# Patient Record
Sex: FEMALE | Race: WHITE | Employment: OTHER | URBAN - METROPOLITAN AREA
[De-identification: names, ages, dates, MRNs, and addresses within clinical notes are randomized per-mention and may not be internally consistent; named-entity substitution may affect disease eponyms.]

---

## 2021-10-14 ENCOUNTER — TELEPHONE (OUTPATIENT)
Dept: ONCOLOGY | Age: 66
End: 2021-10-14

## 2021-10-14 NOTE — TELEPHONE ENCOUNTER
Called pt to schedule consult. Pt declined appt. She stated Dr. Melinda Ott found another provider as she requested.

## 2022-08-31 NOTE — PERIOP NOTES
Called and spoke with  to confirm patient's appointment time and arrival time for tomorrow's procedure

## 2022-09-01 ENCOUNTER — ANESTHESIA EVENT (OUTPATIENT)
Dept: ENDOSCOPY | Age: 67
End: 2022-09-01
Payer: MEDICARE

## 2022-09-01 ENCOUNTER — HOSPITAL ENCOUNTER (OUTPATIENT)
Age: 67
Setting detail: OUTPATIENT SURGERY
Discharge: HOME | End: 2022-09-01
Attending: INTERNAL MEDICINE | Admitting: INTERNAL MEDICINE
Payer: MEDICARE

## 2022-09-01 ENCOUNTER — ANESTHESIA (OUTPATIENT)
Dept: ENDOSCOPY | Age: 67
End: 2022-09-01
Payer: MEDICARE

## 2022-09-01 VITALS
TEMPERATURE: 98.4 F | SYSTOLIC BLOOD PRESSURE: 125 MMHG | HEART RATE: 70 BPM | HEIGHT: 64 IN | OXYGEN SATURATION: 97 % | WEIGHT: 128.7 LBS | BODY MASS INDEX: 21.97 KG/M2 | DIASTOLIC BLOOD PRESSURE: 64 MMHG | RESPIRATION RATE: 16 BRPM

## 2022-09-01 PROCEDURE — 74011250636 HC RX REV CODE- 250/636: Performed by: INTERNAL MEDICINE

## 2022-09-01 PROCEDURE — 2709999900 HC NON-CHARGEABLE SUPPLY: Performed by: INTERNAL MEDICINE

## 2022-09-01 PROCEDURE — 74011000250 HC RX REV CODE- 250: Performed by: NURSE ANESTHETIST, CERTIFIED REGISTERED

## 2022-09-01 PROCEDURE — 74011250636 HC RX REV CODE- 250/636: Performed by: NURSE ANESTHETIST, CERTIFIED REGISTERED

## 2022-09-01 PROCEDURE — 77030021593 HC FCPS BIOP ENDOSC BSC -A: Performed by: INTERNAL MEDICINE

## 2022-09-01 PROCEDURE — 76060000031 HC ANESTHESIA FIRST 0.5 HR: Performed by: INTERNAL MEDICINE

## 2022-09-01 PROCEDURE — 76040000019: Performed by: INTERNAL MEDICINE

## 2022-09-01 RX ORDER — FLUORIDE (SODIUM) 1.1 %
PASTE (ML) DENTAL
COMMUNITY
Start: 2022-07-18

## 2022-09-01 RX ORDER — AMLODIPINE BESYLATE 5 MG/1
5 TABLET ORAL DAILY
COMMUNITY
Start: 2022-07-17

## 2022-09-01 RX ORDER — LIDOCAINE HYDROCHLORIDE 20 MG/ML
INJECTION, SOLUTION EPIDURAL; INFILTRATION; INTRACAUDAL; PERINEURAL AS NEEDED
Status: DISCONTINUED | OUTPATIENT
Start: 2022-09-01 | End: 2022-09-01 | Stop reason: HOSPADM

## 2022-09-01 RX ORDER — ALBUTEROL SULFATE 0.83 MG/ML
2.5 SOLUTION RESPIRATORY (INHALATION) AS NEEDED
Status: CANCELLED | OUTPATIENT
Start: 2022-09-01

## 2022-09-01 RX ORDER — SODIUM CHLORIDE 0.9 % (FLUSH) 0.9 %
5-40 SYRINGE (ML) INJECTION AS NEEDED
Status: CANCELLED | OUTPATIENT
Start: 2022-09-01

## 2022-09-01 RX ORDER — SODIUM CHLORIDE 0.9 % (FLUSH) 0.9 %
5-40 SYRINGE (ML) INJECTION EVERY 8 HOURS
Status: CANCELLED | OUTPATIENT
Start: 2022-09-01

## 2022-09-01 RX ORDER — ESTRADIOL 0.1 MG/G
CREAM VAGINAL
COMMUNITY
Start: 2022-07-17

## 2022-09-01 RX ORDER — NALOXONE HYDROCHLORIDE 0.4 MG/ML
0.4 INJECTION, SOLUTION INTRAMUSCULAR; INTRAVENOUS; SUBCUTANEOUS
Status: CANCELLED | OUTPATIENT
Start: 2022-09-01 | End: 2022-09-01

## 2022-09-01 RX ORDER — BIMATOPROST 3 UG/ML
SOLUTION TOPICAL
COMMUNITY
Start: 2022-06-08

## 2022-09-01 RX ORDER — DEXTROMETHORPHAN/PSEUDOEPHED 2.5-7.5/.8
1.2 DROPS ORAL
Status: CANCELLED | OUTPATIENT
Start: 2022-09-01

## 2022-09-01 RX ORDER — MAGNESIUM SULFATE 100 %
4 CRYSTALS MISCELLANEOUS AS NEEDED
Status: CANCELLED | OUTPATIENT
Start: 2022-09-01

## 2022-09-01 RX ORDER — EPINEPHRINE 0.1 MG/ML
1 INJECTION INTRACARDIAC; INTRAVENOUS
Status: CANCELLED | OUTPATIENT
Start: 2022-09-01 | End: 2022-09-02

## 2022-09-01 RX ORDER — IVERMECTIN 10 MG/G
CREAM TOPICAL
COMMUNITY

## 2022-09-01 RX ORDER — ATROPINE SULFATE 0.1 MG/ML
0.5 INJECTION INTRAVENOUS
Status: CANCELLED | OUTPATIENT
Start: 2022-09-01 | End: 2022-09-02

## 2022-09-01 RX ORDER — PROPOFOL 10 MG/ML
INJECTION, EMULSION INTRAVENOUS AS NEEDED
Status: DISCONTINUED | OUTPATIENT
Start: 2022-09-01 | End: 2022-09-01 | Stop reason: HOSPADM

## 2022-09-01 RX ORDER — FLUMAZENIL 0.1 MG/ML
0.2 INJECTION INTRAVENOUS
Status: CANCELLED | OUTPATIENT
Start: 2022-09-01 | End: 2022-09-01

## 2022-09-01 RX ORDER — SODIUM CHLORIDE, SODIUM LACTATE, POTASSIUM CHLORIDE, CALCIUM CHLORIDE 600; 310; 30; 20 MG/100ML; MG/100ML; MG/100ML; MG/100ML
75 INJECTION, SOLUTION INTRAVENOUS CONTINUOUS
Status: CANCELLED | OUTPATIENT
Start: 2022-09-01

## 2022-09-01 RX ORDER — SODIUM CHLORIDE 9 MG/ML
125 INJECTION, SOLUTION INTRAVENOUS CONTINUOUS
Status: DISCONTINUED | OUTPATIENT
Start: 2022-09-01 | End: 2022-09-01 | Stop reason: HOSPADM

## 2022-09-01 RX ADMIN — PROPOFOL 100 MG: 10 INJECTION, EMULSION INTRAVENOUS at 12:51

## 2022-09-01 RX ADMIN — SODIUM CHLORIDE 125 ML/HR: 9 INJECTION, SOLUTION INTRAVENOUS at 11:16

## 2022-09-01 RX ADMIN — LIDOCAINE HYDROCHLORIDE 80 MG: 20 INJECTION, SOLUTION INTRAVENOUS at 12:51

## 2022-09-01 RX ADMIN — PROPOFOL 50 MG: 10 INJECTION, EMULSION INTRAVENOUS at 12:59

## 2022-09-01 RX ADMIN — PROPOFOL 50 MG: 10 INJECTION, EMULSION INTRAVENOUS at 12:54

## 2022-09-01 RX ADMIN — PROPOFOL 50 MG: 10 INJECTION, EMULSION INTRAVENOUS at 12:56

## 2022-09-01 RX ADMIN — PROPOFOL 50 MG: 10 INJECTION, EMULSION INTRAVENOUS at 12:52

## 2022-09-01 NOTE — PERIOP NOTES
Reviewed discharge plan of care with patient and her , written instructions provided as well. They also spoke with Dr Marjorie Lawrence.  They verbalized understanding

## 2022-09-01 NOTE — DISCHARGE INSTRUCTIONS
[facility]  [id number]  1955    COLON DISCHARGE INSTRUCTIONS    Discomfort:  Redness at IV site- apply warm compress to area; if redness or soreness persist- contact your physician  There may be a slight amount of blood passed from the rectum  Gaseous discomfort- walking, belching will help relieve any discomfort  You should not operate a vehicle for 12 hours  You should not engage in an occupation involving machinery or appliances for rest of today  You may not drink alcoholic beverages for at least 12 hours  Avoid making any critical decisions for at least 24 hour  DIET:   Regular diet. - however -  remember your colon is empty and a heavy meal will produce gas. Avoid these foods:  vegetables, fried / greasy foods, carbonated drinks for today     ACTIVITY:  You may resume your normal daily activities it is recommended that you spend the remainder of the day resting -  avoid any strenuous activity. CALL M.D. ANY SIGN OF:   Increasing pain, nausea, vomiting  Abdominal distension (swelling)  New increased bleeding (oral or rectal)  Fever (chills)  Pain in chest area  Bloody discharge from nose or mouth  Shortness of breath      Follow-up Instructions:  Repeat colonoscopy in 10 years                          [facility]  [id number]  1955        DISCHARGE SUMMARY from Nurse    The following personal items collected during your admission are returned to you:   Dental Appliance: Dental Appliances: None  Vision: Visual Aid: Glasses, Contacts, At bedside, With patient  Hearing Aid:    Jewelry:    Clothing:    Other Valuables:    Valuables sent to safe:      {Medication reconciliation information is now added to the patient's AVS automatically when it is printed. There is no need to use this SmartLink in discharge instructions.   Highlight this text and delete it to clear this message}                  Sedation for a Medical Procedure: Care Instructions  Your Care Instructions     For a minor procedure or surgery, you will get a sedative to help you relax. This drug will make you sleepy. It is usually given in a vein (by IV). It may be used with anesthesia. There are different types of anesthesia. You and your doctor or anesthesia specialist will work together to choose the best anesthesia for you. It is usually based on your health, the procedure, and your preference. Local anesthesia is a shot given to numb a small part of the body. Regional anesthesia is a shot that blocks pain to a larger area of the body. General anesthesia affects the brain and the whole body. You get it through a small tube placed in a vein (IV). Or you may breathe it in. You are unconscious and will not feel pain. You may get monitored anesthesia care (MAC). This means that an anesthesia specialist will care for you during your surgery. He or she will make sure that you get only the level of anesthesia care you need to prevent pain for your specific case. If you had anesthesia, you may feel some pain and discomfort as it wears off. If you have pain, don't be afraid to say so. Pain medicine works better if you take it before the pain gets bad. Common side effects from sedation include:  Feeling sleepy. (Your doctors and nurses will make sure you are not too sleepy to go home.)  Nausea and vomiting. This usually does not last long. Feeling tired. Follow-up care is a key part of your treatment and safety. Be sure to make and go to all appointments, and call your doctor if you are having problems. It's also a good idea to know your test results and keep a list of the medicines you take. How can you care for yourself at home? Activity    Don't do anything for 24 hours that requires attention to detail. This includes going to work or school, making important decisions, and signing any legal documents. It takes time for the medicine effects to completely wear off.      For your safety, you should not drive or operate any machinery that could be dangerous until the medicine wears off and you can think clearly and react easily. When you get home, it is important to rest until the anesthesia has worn off. Some people will feel drowsy or dizzy for up to a few hours after leaving the hospital.     Take your time and walk slowly. Sudden changes in position may also cause nausea. Rest when you feel tired. Getting enough sleep will help you recover. Diet    You can eat your normal diet, unless your doctor gives you other instructions. If your stomach is upset, try clear liquids and bland, low-fat foods like plain toast or rice. Drink plenty of fluids (unless your doctor tells you not to). Don't drink alcohol for 24 hours. Medicines    Be safe with medicines. Read and follow all instructions on the label. If the doctor gave you a prescription medicine for pain, take it as prescribed. If you are taking opioids for pain, it is very important to take them as prescribed. Opioids can easily be misused. Misuse can lead to opioid use disorder and even death. Because of this, it is best to get off them as soon as possible. As soon as you don't need them, talk to your doctor about how to safely stop taking them. Also talk with your doctor about how to safely store and get rid of opioids. If you are not taking a prescription pain medicine, ask your doctor if you can take an over-the-counter medicine. If you think your pain medicine is making you sick to your stomach, you can try these things. Take your medicine after meals (unless your doctor has told you not to). Ask your doctor for a different pain medicine. When should you call for help? Call 911 anytime you think you may need emergency care. For example, call if:    You have severe trouble breathing. You passed out (lost consciousness). Call your doctor now or seek immediate medical care if:    You have trouble breathing.      You have ongoing or worsening nausea or vomiting. You have a fever. You have a new or worse headache. The medicine is not wearing off and you can't think clearly. Watch closely for changes in your health, and be sure to contact your doctor if:    You do not get better as expected. Where can you learn more? Go to http://www.gray.com/  Enter G817 in the search box to learn more about \"Sedation for a Medical Procedure: Care Instructions. \"  Current as of: February 11, 2021               Content Version: 13.2  © 7245-9674 [employer]. Care instructions adapted under license by [employer] (which disclaims liability or warranty for this information). If you have questions about a medical condition or this instruction, always ask your healthcare professional. [address] any warranty or liability for your use of this information.

## 2022-09-01 NOTE — ANESTHESIA POSTPROCEDURE EVALUATION
Post-Anesthesia Evaluation and Assessment    Cardiovascular Function/Vital Signs  Visit Vitals  /64   Pulse 70   Temp 36.9 °C (98.4 °F)   Resp 16   Ht 5' 4\" (1.626 m)   Wt 58.4 kg (128 lb 11.2 oz)   SpO2 97%   Breastfeeding No   BMI 22.09 kg/m²       Patient is status post Procedure(s):  COLONOSCOPY WITH MAC. Nausea/Vomiting: Controlled. Postoperative hydration reviewed and adequate. Pain:  Pain Scale 1: Numeric (0 - 10) (09/01/22 1324)  Pain Intensity 1: 0 (09/01/22 1324)   Managed. Neurological Status: At baseline. Mental Status and Level of Consciousness: Arousable. Pulmonary Status:   O2 Device: None (Room air) (09/01/22 1324)   Adequate oxygenation and airway patent. Complications related to anesthesia: None    Post-anesthesia assessment completed. No concerns. Patient has met all discharge requirements.     Signed By: Liane Farrell CRNA    September 1, 2022

## 2022-09-01 NOTE — ANESTHESIA PREPROCEDURE EVALUATION
Relevant Problems   No relevant active problems       Anesthetic History   No history of anesthetic complications            Review of Systems / Medical History  Patient summary reviewed, nursing notes reviewed and pertinent labs reviewed    Pulmonary  Within defined limits                 Neuro/Psych             Comments: Had 2 cervical spine fractures C2 Cardiovascular    Hypertension: well controlled              Exercise tolerance: >4 METS     GI/Hepatic/Renal           PUD     Endo/Other      Hypothyroidism: well controlled       Other Findings            Physical Exam    Airway  Mallampati: II  TM Distance: > 6 cm  Neck ROM: normal range of motion   Mouth opening: Normal     Cardiovascular  Regular rate and rhythm,  S1 and S2 normal,  no murmur, click, rub, or gallop    Rate: normal         Dental  No notable dental hx       Pulmonary  Breath sounds clear to auscultation               Abdominal  GI exam deferred       Other Findings            Anesthetic Plan    ASA: 2  Anesthesia type: MAC          Induction: Intravenous  Anesthetic plan and risks discussed with: Patient and Spouse

## 2022-09-01 NOTE — H&P
[facility] [address] [place] [address]  Tel: [phone]  Fax: [phone]    Patient: Antonia Martin   YOB: 1955   Birth Sex: Female      Current Gender: Female      Date:  08/26/2021 9:20 AM    Historian:   self   Visit Type:  Office Visit         Assessment/Plan  # Detail Type Description    1. Assessment HTN (I10). Patient Plan continue bp meds day of procedure         2. Assessment Personal history of colonic polyps (Z86.010). Patient Plan This very pleasant 74year old lady was referred by Dr. Carmina Cuba for screening colonoscopy. Her last colonoscopy was in 2016 with findings of a small pedunculated benign polyp and diverticulosis in the sigmoid colon. Pertinent negatives include first degree family history of CRC,  changes in bowel habits, hematochezia, abdominal pain, n&v and unintentional weight loss. She has had a stage 0 melanoma lesion on her right arm removed. She denies acid reflux, dysphagia and early satiety. Schedule for COLO with MAC, miralax prep. The risks, benefits, adverse reactions were discussed in detail today with the patient. This includes, but is not limited to, the risk of bleeding, infections, perforation, death, missed lesions, reactions to anesthesia/sedation, other. Patient understands and agrees to undergo the procedure    Plan Orders Further diagnostic evaluations ordered today include(s) DIAGNOSTIC COLONOSCOPY to be performed. This 74year old  patient was referred by Nathaly Mae. This 74year old female presents for Colon Cancer Screening. History of Present Illness  1. Colon Cancer Screening     Problem List  Problem List reviewed.    Problem Description Onset Date Chronic Clinical Status Notes   IBS - Irritable bowel syndrome  Y     Rosacea  Y         Medications (active prior to today)  Medication Instructions Start Date Stop Date Refilled Elsewhere   Fish Oil 340 mg-1,000 mg capsule  //   Y   Vitamin D3 5,000 unit tablet take 1 tablet by oral route  every day //   Y   estradiol 0.025 mg/24 hr semiweekly transdermal patch apply 1 patch by transdermal route 2 times every week cyclically, 3 weeks on and 1 week off 12/17/2020 12/17/2020 N   Estrace 0.01% (0.1 mg/gram) vaginal cream insert (1G)  by vaginal route 2 times every week 02/09/2021 02/09/2021 N   Norvasc 5 mg tablet take 1 tablet by oral route  every day 03/25/2021 03/25/2021 N   Latisse 0.03 % eyelash drops apply 1 drop by topical route  every day to applicator and apply to upper eyelid, along eyelashes, at nighttime 07/08/2021 07/08/2021 N     Patient Status   Completed with information received for patient in a summary of care record. Medication Reconciliation  Medications reconciled today.     Medication Reviewed  Adherence Medication Name Sig Desc Elsewhere Status   taking as directed Fish Oil 340 mg-1,000 mg capsule  Y Verified   taking as directed Vitamin D3 5,000 unit tablet take 1 tablet by oral route  every day Y Verified   taking as directed Estrace 0.01% (0.1 mg/gram) vaginal cream insert (1G)  by vaginal route 2 times every week N Verified   taking as directed estradiol 0.025 mg/24 hr semiweekly transdermal patch apply 1 patch by transdermal route 2 times every week cyclically, 3 weeks on and 1 week off N Verified   taking as directed Latisse 0.03 % eyelash drops apply 1 drop by topical route  every day to applicator and apply to upper eyelid, along eyelashes, at nighttime N Verified   taking as directed Norvasc 5 mg tablet take 1 tablet by oral route  every day N Verified     Medications (Added, Continued or Stopped today)  Start Date Medication Directions PRN Status PRN Reason Instruction Stop Date   02/09/2021 Estrace 0.01% (0.1 mg/gram) vaginal cream insert (1G)  by vaginal route 2 times every week N      12/17/2020 estradiol 0.025 mg/24 hr semiweekly transdermal patch apply 1 patch by transdermal route 2 times every week cyclically, 3 weeks on and 1 week off N       Fish Oil 340 mg-1,000 mg capsule  N      07/08/2021 Latisse 0.03 % eyelash drops apply 1 drop by topical route  every day to applicator and apply to upper eyelid, along eyelashes, at nighttime N      03/25/2021 Norvasc 5 mg tablet take 1 tablet by oral route  every day N       Vitamin D3 5,000 unit tablet take 1 tablet by oral route  every day N        Allergies  Ingredient Reaction (Severity) Medication Name Comment   EPINEPHRINE tachycardia, dizzyness       Reviewed, no changes. Orders  Status Lab Order Time Frame Comments   result received CBC With Differential/Platelet     result received Comp Metabolic Panel (14) AU     result received Lipid with LDL/HDL Ratio AU     result received TSH     ordered Dexa, Bone Density Scan     result received Dexa, Bone Density Scan     result received CBC With Differential/Platelet     result received Comp Metabolic Panel (14) AU     result received H. pylori, IgG, Abs     ordered UPPR GI ENDOSCOPY, DIAGNOSIS     ordered follow-up visit 1-2 wks after     result received BIOPSY, COLON  #1 gastric Bx R/O Hpylori #2 ascending colon polyp   result received BIOPSY, STOMACH  #1 gastric Bx R/O Hpylori #2 ascending colon polyp   ordered GI TRACT CAPSULE ENDOSCOPY mon/tues    ordered follow-up visit As needed     result received CHEST X-RAY PA & LAT     result received CBC With Differential/Platelet     result received Comp Metabolic Panel (14) AU     result received Lipid with LDL/HDL Ratio AU     result received TSH     result received Ferritin, Serum     result received Vitamin D 25     ordered Dexa, Bone Density Scan     ordered Referrals: Plastic Surgery. Guerda Greenwood. Evaluate and treat  Pt has contact information to schedule. MARIUSZ Delgado.    result received Dexa, Bone Density Scan     ordered CBC With Differential/Platelet     ordered Comp Metabolic Panel (14) AU     ordered Lipid Panel calc LDL     ordered TSH     ordered Ferritin, Serum     result received Pap IG, rfx HPV ASCU     obtained PAP, send out     ordered Pathology (tissue specimen)     result received Histopathology  cxbx 11:00 and ecc   result received Pap IG, HPV-hr     result received Anatomic Pathology Report  CxBx   ordered Comp Metabolic Panel (14) AU     ordered CBC With Differential/Platelet     ordered Ferritin, Serum     ordered Lipid Panel calc LDL     ordered DIAGNOSTIC COLONOSCOPY         Review of Systems  System Neg/Pos Details   Constitutional Negative Chills and Fever. ENMT Negative Ear infections and Nasal congestion. Respiratory Negative Chronic cough, Dyspnea and Wheezing. Cardio Negative Chest pain.  Negative Dysuria. Endocrine Negative Cold intolerance and Heat intolerance. Neuro Negative Dizziness and Headache. Psych Negative Anxiety and Depression. MS Negative Back pain and Joint pain. Hema/Lymph Negative Easy bleeding and Easy bruising. Reproductive Positive The patient is post-menopausal (The menopause was hysterectomy w/BSO). Vital Signs   Gynecologic History  Patient is postmenopausal.      Height  Time ft in cm Last Measured Height Position   9:35 AM 5.0 3.25 160.66 02/09/2021 Standing     Weight/BSA/BMI  Time lb oz kg Context BMI kg/m2 BSA m2   9:35 .00  62.596  24.25      Blood Pressure  Time BP mm/Hg Position Side Site Method Cuff Size   9:35 /80          Temperature/Pulse/Respiration  Time Temp F Temp C Temp Site Pulse/min Pattern Resp/ min   9:35 AM    78       Pulse Oximetry/FIO2  Time Pulse Ox (Rest %) Pulse Ox (Amb %) O2 Sat O2 L/Min Timing FiO2 % L/min Delivery Method Finger Probe   9:35 AM 98  RA           Measured by  Time Measured by   9:35 AM Noel Beckwith     Physical  Exam  Exam Findings Details   Constitutional Normal No acute distress. Well nourished. Well developed.    Head/Face Normal Facial features - Normal. Eyebrows - Normal.   Eyes Normal General - Right: Normal, Left: Normal. Lids/external - Right: Normal, Left: Normal. Conjunctiva - Right: Normal, Left: Normal. Sclera - Right: Normal, Left: Normal.   Ears Normal Inspection - Right: Normal, Left: Normal.   Nose/Mouth/Throat Normal External nose - Normal. Lips/teeth/gums - Normal.   Nose/Mouth/Throat Normal External nose - Normal. Septum - Normal. Lips/teeth/gums - Normal. Tongue - Normal.   Neck Exam Normal Inspection - Normal. Palpation - Normal. Thyroid gland - Normal. Range of motion - Normal.   Respiratory Normal Inspection - Normal. Auscultation - Normal. Effort - Normal.   Cardiovascular Normal Inspection - JVD: Absent. Palpation/percussion - PMI normal. Heart rate - Regular rate. Rhythm - Regular. Heart sounds - Normal S1, Normal S2. Abdomen Normal Patient is not obese. Inspection - Normal. Appliance(s) - None. Abdominal muscles - Normal. Auscultation - Normal. Percussion - Normal. Anterior palpation - Normal, No guarding, No rebound. CVA tenderness - None. Umbilicus - Normal. No abdominal tenderness. No hepatic enlargement. No spleen enlargement. No hernia. No ascites. No palpable mass. Evans's sign - Normal. No hepatic tenderness. Genitourinary Normal No CVA Tenderness. No flank mass. Skin * Body areas examined - Scalp/hair, Head/face, Neck, Abdomen. Skin Normal Inspection - Normal. Nails - Normal. Hair - Normal.   Musculoskeletal Normal Overview - Normal. Hands - Left: Normal, Right: Normal.   Neurological Normal Level of consciousness - Normal. Orientation - Normal. Memory - Normal. Cranial nerves - Cranial nerves II through XII grossly intact. Sensory - Normal. Motor - Normal. Balance & gait - Normal. Coordination - Normal.   Psychiatric Normal Orientation - Oriented to time, place, person & situation. Not anxious. Behavior is appropriate for age. Sufficient fund of knowledge. Sufficient language. No memory loss. No mood swings. Normal insight. Normal judgment.    Immunizations Entered by History  Date Immunization   9/3/2020 12:00:00 AM influenza, intradermal, quadrivalent, preservative free, injectable   8/28/2019 12:00:00 AM Seasonal trivalent influenza vaccine, adjuvanted, preservative free   5/7/2019 12:00:00 AM Pneumo (2 yrs or older) (PPV23)   7/17/2018 12:00:00 AM Zostavax   1/1/2015 12:00:00 AM zoster vaccine, live   1/1/2011 12:00:00 AM tetanus toxoid, reduced diphtheria toxoid, and acellular pertussis vaccine, adsorbed   11/24/2014 12:00:00 AM Flu (3 yrs or older)       Active Patient Care Team Members  Name Contact Agency Type Support Role Relationship Active Date Inactive Date Specialty   Yasmeen Baker   encounter provider    Gastroenterology   Mary Nguyen   Emergency Contact Spouse      Herbie Vu   encounter provider    Gastroenterology   Emma Hawley   Patient provider PCP   Family Practice       I was available at the time of service and agree with the plan of care on 8/26/2021    Encounter submitted for review by Yasmeen MEAD on 08/26/2021 10:26 AM.    Visit details reviewed and approved by supervising provider Kira Hogue MD on 08/27/2021. Document generated by: Kira Hogue 08/27/2021    CC Providers    Emma Hawley   [address] [place] [address], [facility] David Nickerson Jesisca-    ----------------------------------------------------------------------------------------------------------------------------------------------------------------------      Electronically signed by Yasmeen MEAD on 08/29/2021 09:15 AM    Pt seen and examined. No interval change.

## 2022-09-01 NOTE — PROCEDURES
Colonoscopy Procedure Note    Indications: Previous adenomatous polyp    Anesthesia/Sedation: MAC anesthesia Propofol    Pre-Procedure Exam:  Airway: clear   Heart: normal S1and S2    Lungs: clear bilateral  Abdomen: soft, nontender, bowel sounds present and normal in all quadrants   Mental Status: awake, alert, and oriented to person, place, and time      Procedure in Detail:  Informed consent was obtained for the procedure, including sedation. Risks of perforation, hemorrhage, adverse drug reaction, and aspiration were discussed. The patient was placed in the left lateral decubitus position. Based on the pre-procedure assessment, including review of the patient's medical history, medications, allergies, and review of systems, she had been deemed to be an appropriate candidate for moderate sedation; she was therefore sedated with the medications listed above. The patient was monitored continuously with ECG tracing, pulse oximetry, blood pressure monitoring, and direct observations. A rectal examination was performed. The [id number] was inserted into the rectum and advanced under direct vision to the cecum, which was identified by the ileocecal valve and appendiceal orifice. The quality of the colonic preparation was excellent. A careful inspection was made as the colonoscope was withdrawn, including a retroflexed view of the rectum; findings and interventions are described below. Appropriate photodocumentation was obtained. ANUS: Anal exam reveals no masses or hemorrhoids, sphincter tone is normal.   RECTUM: Rectal exam reveals no masses or hemorrhoids. SIGMOID COLON: The mucosa is normal with good vascular pattern and without ulcers, diverticula, and polyps. DESCENDING COLON: The mucosa is normal with good vascular pattern and without ulcers, diverticula, and polyps.    SPLENIC FLEXURE: The splenic flexure is normal.   TRANSVERSE COLON: The mucosa is normal with good vascular pattern and without ulcers, diverticula, and polyps. HEPATIC FLEXURE: The hepatic flexure is normal.   ASCENDING COLON: The mucosa is normal with good vascular pattern and without ulcers, diverticula, and polyps. CECUM: The appendiceal orifice appears normal. The ileocecal valve appears normal.   TERMINAL ILEUM: The terminal ileum was not entered. Specimens: No specimens were collected. EBL: None    No prosthetic devices, grafts, tissues, transplant or devices implanted. Withdrawal Time: 6 min    Complications: None; patient tolerated the procedure well. Attending Attestation: I performed the procedure. Recommendations:   - For colon cancer screening in this average-risk patient, colonoscopy may be repeated in 10 years.     Signed By: Neymar Cm MD                      9/1/2022

## (undated) DEVICE — MAJ-1414 SINGLE USE ADPATER BIOPSY VALV: Brand: SINGLE USE ADAPTOR BIOPSY VALVE

## (undated) DEVICE — CATH IV SAFE STR 22GX1IN BLU -- PROTECTIV PLUS

## (undated) DEVICE — SET ADMIN 16ML TBNG L100IN 2 Y INJ SITE IV PIGGY BK DISP

## (undated) DEVICE — 4-PORT MANIFOLD: Brand: NEPTUNE 2

## (undated) DEVICE — MOUTHPIECE ENDOSCP 20X27MM --

## (undated) DEVICE — NDL PRT INJ NSAF BLNT 18GX1.5 --

## (undated) DEVICE — ENDO CARRY-ON PROCEDURE KIT INCLUDES ENZYMATIC SPONGE, GAUZE, BIOHAZARD LABEL, TRAY, LUBRICANT, DIRTY SCOPE LABEL, WATER LABEL, TRAY, DRAWSTRING PAD, AND DEFENDO 4-PIECE KIT.: Brand: ENDO CARRY-ON PROCEDURE KIT

## (undated) DEVICE — Device

## (undated) DEVICE — KENDALL RADIOLUCENT FOAM MONITORING ELECTRODE RECTANGULAR SHAPE: Brand: KENDALL

## (undated) DEVICE — SPONGE GZ W4XL4IN RAYON POLY 4 PLY NONWOVEN FASTER WICKING

## (undated) DEVICE — TRAP SPEC COLL POLYP POLYSTYR --

## (undated) DEVICE — FORCEPS BX CAP 240CM L RAD JAW 4

## (undated) DEVICE — SYR 3ML LL TIP 1/10ML GRAD --

## (undated) DEVICE — SYR 50ML SLIP TIP NSAF LF STRL --

## (undated) DEVICE — EJECTOR SALIVA 6 IN FLX CLR

## (undated) DEVICE — BRUSH CYTO L240CM DIA2.3MM GI COLONOSCOPY 3 RNG HNDL RADPQ

## (undated) DEVICE — SINGLE PORT MANIFOLD: Brand: NEPTUNE 2

## (undated) DEVICE — TRNQT TEXT 1X18IN BLU LF DISP -- CONVERT TO ITEM 362165